# Patient Record
Sex: FEMALE | Race: WHITE | NOT HISPANIC OR LATINO | URBAN - METROPOLITAN AREA
[De-identification: names, ages, dates, MRNs, and addresses within clinical notes are randomized per-mention and may not be internally consistent; named-entity substitution may affect disease eponyms.]

---

## 2022-12-18 ENCOUNTER — INPATIENT (INPATIENT)
Facility: HOSPITAL | Age: 27
LOS: 3 days | Discharge: HOME | DRG: 189 | End: 2022-12-22
Attending: STUDENT IN AN ORGANIZED HEALTH CARE EDUCATION/TRAINING PROGRAM | Admitting: INTERNAL MEDICINE
Payer: COMMERCIAL

## 2022-12-18 VITALS
TEMPERATURE: 102 F | DIASTOLIC BLOOD PRESSURE: 92 MMHG | SYSTOLIC BLOOD PRESSURE: 148 MMHG | RESPIRATION RATE: 22 BRPM | HEART RATE: 120 BPM | OXYGEN SATURATION: 95 % | WEIGHT: 293 LBS

## 2022-12-18 LAB
ALBUMIN SERPL ELPH-MCNC: 4.1 G/DL — SIGNIFICANT CHANGE UP (ref 3.3–5.2)
ALP SERPL-CCNC: 91 U/L — SIGNIFICANT CHANGE UP (ref 40–120)
ALT FLD-CCNC: 22 U/L — SIGNIFICANT CHANGE UP
ANION GAP SERPL CALC-SCNC: 15 MMOL/L — SIGNIFICANT CHANGE UP (ref 5–17)
AST SERPL-CCNC: 19 U/L — SIGNIFICANT CHANGE UP
BASOPHILS # BLD AUTO: 0.04 K/UL — SIGNIFICANT CHANGE UP (ref 0–0.2)
BASOPHILS NFR BLD AUTO: 0.9 % — SIGNIFICANT CHANGE UP (ref 0–2)
BILIRUB SERPL-MCNC: 0.4 MG/DL — SIGNIFICANT CHANGE UP (ref 0.4–2)
BUN SERPL-MCNC: 8 MG/DL — SIGNIFICANT CHANGE UP (ref 8–20)
CALCIUM SERPL-MCNC: 9.2 MG/DL — SIGNIFICANT CHANGE UP (ref 8.4–10.5)
CHLORIDE SERPL-SCNC: 100 MMOL/L — SIGNIFICANT CHANGE UP (ref 96–108)
CO2 SERPL-SCNC: 21 MMOL/L — LOW (ref 22–29)
CREAT SERPL-MCNC: 0.93 MG/DL — SIGNIFICANT CHANGE UP (ref 0.5–1.3)
EGFR: 86 ML/MIN/1.73M2 — SIGNIFICANT CHANGE UP
EOSINOPHIL # BLD AUTO: 0.03 K/UL — SIGNIFICANT CHANGE UP (ref 0–0.5)
EOSINOPHIL NFR BLD AUTO: 0.6 % — SIGNIFICANT CHANGE UP (ref 0–6)
FLUAV AG NPH QL: DETECTED
FLUBV AG NPH QL: SIGNIFICANT CHANGE UP
GLUCOSE SERPL-MCNC: 109 MG/DL — HIGH (ref 70–99)
HCG SERPL-ACNC: <4 MIU/ML — SIGNIFICANT CHANGE UP
HCT VFR BLD CALC: 42.8 % — SIGNIFICANT CHANGE UP (ref 34.5–45)
HGB BLD-MCNC: 13.9 G/DL — SIGNIFICANT CHANGE UP (ref 11.5–15.5)
IMM GRANULOCYTES NFR BLD AUTO: 0.4 % — SIGNIFICANT CHANGE UP (ref 0–0.9)
LYMPHOCYTES # BLD AUTO: 0.58 K/UL — LOW (ref 1–3.3)
LYMPHOCYTES # BLD AUTO: 12.5 % — LOW (ref 13–44)
MCHC RBC-ENTMCNC: 26.9 PG — LOW (ref 27–34)
MCHC RBC-ENTMCNC: 32.5 GM/DL — SIGNIFICANT CHANGE UP (ref 32–36)
MCV RBC AUTO: 82.8 FL — SIGNIFICANT CHANGE UP (ref 80–100)
MONOCYTES # BLD AUTO: 0.42 K/UL — SIGNIFICANT CHANGE UP (ref 0–0.9)
MONOCYTES NFR BLD AUTO: 9.1 % — SIGNIFICANT CHANGE UP (ref 2–14)
NEUTROPHILS # BLD AUTO: 3.54 K/UL — SIGNIFICANT CHANGE UP (ref 1.8–7.4)
NEUTROPHILS NFR BLD AUTO: 76.5 % — SIGNIFICANT CHANGE UP (ref 43–77)
PLATELET # BLD AUTO: 320 K/UL — SIGNIFICANT CHANGE UP (ref 150–400)
POTASSIUM SERPL-MCNC: 3.7 MMOL/L — SIGNIFICANT CHANGE UP (ref 3.5–5.3)
POTASSIUM SERPL-SCNC: 3.7 MMOL/L — SIGNIFICANT CHANGE UP (ref 3.5–5.3)
PROT SERPL-MCNC: 7.5 G/DL — SIGNIFICANT CHANGE UP (ref 6.6–8.7)
RBC # BLD: 5.17 M/UL — SIGNIFICANT CHANGE UP (ref 3.8–5.2)
RBC # FLD: 14.4 % — SIGNIFICANT CHANGE UP (ref 10.3–14.5)
RSV RNA NPH QL NAA+NON-PROBE: SIGNIFICANT CHANGE UP
SARS-COV-2 RNA SPEC QL NAA+PROBE: SIGNIFICANT CHANGE UP
SODIUM SERPL-SCNC: 136 MMOL/L — SIGNIFICANT CHANGE UP (ref 135–145)
WBC # BLD: 4.63 K/UL — SIGNIFICANT CHANGE UP (ref 3.8–10.5)
WBC # FLD AUTO: 4.63 K/UL — SIGNIFICANT CHANGE UP (ref 3.8–10.5)

## 2022-12-18 PROCEDURE — 93010 ELECTROCARDIOGRAM REPORT: CPT

## 2022-12-18 PROCEDURE — 99285 EMERGENCY DEPT VISIT HI MDM: CPT

## 2022-12-18 PROCEDURE — 71045 X-RAY EXAM CHEST 1 VIEW: CPT | Mod: 26

## 2022-12-18 RX ORDER — ACETAMINOPHEN 500 MG
650 TABLET ORAL ONCE
Refills: 0 | Status: COMPLETED | OUTPATIENT
Start: 2022-12-18 | End: 2022-12-18

## 2022-12-18 RX ORDER — IPRATROPIUM/ALBUTEROL SULFATE 18-103MCG
3 AEROSOL WITH ADAPTER (GRAM) INHALATION
Refills: 0 | Status: COMPLETED | OUTPATIENT
Start: 2022-12-18 | End: 2022-12-18

## 2022-12-18 RX ORDER — MAGNESIUM SULFATE 500 MG/ML
2 VIAL (ML) INJECTION ONCE
Refills: 0 | Status: COMPLETED | OUTPATIENT
Start: 2022-12-18 | End: 2022-12-18

## 2022-12-18 RX ADMIN — Medication 3 MILLILITER(S): at 21:22

## 2022-12-18 RX ADMIN — Medication 3 MILLILITER(S): at 21:23

## 2022-12-18 RX ADMIN — Medication 150 GRAM(S): at 21:23

## 2022-12-18 RX ADMIN — Medication 125 MILLIGRAM(S): at 21:22

## 2022-12-18 RX ADMIN — Medication 2 GRAM(S): at 21:44

## 2022-12-18 RX ADMIN — Medication 650 MILLIGRAM(S): at 21:22

## 2022-12-18 RX ADMIN — Medication 3 MILLILITER(S): at 21:24

## 2022-12-18 NOTE — ED ADULT TRIAGE NOTE - CHIEF COMPLAINT QUOTE
C/O fever, body aches, cough, congestion and dyspnea on ex for the past 2 days. Unknown sick contacts. coming form urgent care where she tested negative for COVID and FLU.

## 2022-12-19 DIAGNOSIS — J96.01 ACUTE RESPIRATORY FAILURE WITH HYPOXIA: ICD-10-CM

## 2022-12-19 LAB
BASE EXCESS BLDA CALC-SCNC: -1.8 MMOL/L — SIGNIFICANT CHANGE UP (ref -2–3)
BLOOD GAS COMMENTS ARTERIAL: SIGNIFICANT CHANGE UP
GAS PNL BLDA: SIGNIFICANT CHANGE UP
HCO3 BLDA-SCNC: 22 MMOL/L — SIGNIFICANT CHANGE UP (ref 21–28)
HOROWITZ INDEX BLDA+IHG-RTO: 100 — SIGNIFICANT CHANGE UP
NT-PROBNP SERPL-SCNC: 279 PG/ML — SIGNIFICANT CHANGE UP (ref 0–300)
PCO2 BLDA: 31 MMHG — LOW (ref 32–45)
PH BLDA: 7.46 — HIGH (ref 7.35–7.45)
PO2 BLDA: 82 MMHG — LOW (ref 83–108)
PROCALCITONIN SERPL-MCNC: 0.07 NG/ML — SIGNIFICANT CHANGE UP (ref 0.02–0.1)
SAO2 % BLDA: 98.4 % — HIGH (ref 94–98)
TROPONIN T SERPL-MCNC: <0.01 NG/ML — SIGNIFICANT CHANGE UP (ref 0–0.06)

## 2022-12-19 PROCEDURE — 99223 1ST HOSP IP/OBS HIGH 75: CPT

## 2022-12-19 PROCEDURE — 12345: CPT | Mod: NC

## 2022-12-19 PROCEDURE — 71275 CT ANGIOGRAPHY CHEST: CPT | Mod: 26,MB

## 2022-12-19 RX ORDER — CEFTRIAXONE 500 MG/1
INJECTION, POWDER, FOR SOLUTION INTRAMUSCULAR; INTRAVENOUS
Refills: 0 | Status: DISCONTINUED | OUTPATIENT
Start: 2022-12-19 | End: 2022-12-19

## 2022-12-19 RX ORDER — ENOXAPARIN SODIUM 100 MG/ML
40 INJECTION SUBCUTANEOUS EVERY 24 HOURS
Refills: 0 | Status: DISCONTINUED | OUTPATIENT
Start: 2022-12-19 | End: 2022-12-22

## 2022-12-19 RX ORDER — IPRATROPIUM/ALBUTEROL SULFATE 18-103MCG
3 AEROSOL WITH ADAPTER (GRAM) INHALATION ONCE
Refills: 0 | Status: COMPLETED | OUTPATIENT
Start: 2022-12-19 | End: 2022-12-19

## 2022-12-19 RX ORDER — ACETAMINOPHEN 500 MG
650 TABLET ORAL EVERY 6 HOURS
Refills: 0 | Status: DISCONTINUED | OUTPATIENT
Start: 2022-12-19 | End: 2022-12-22

## 2022-12-19 RX ORDER — ONDANSETRON 8 MG/1
4 TABLET, FILM COATED ORAL EVERY 8 HOURS
Refills: 0 | Status: DISCONTINUED | OUTPATIENT
Start: 2022-12-19 | End: 2022-12-22

## 2022-12-19 RX ORDER — CEFTRIAXONE 500 MG/1
1000 INJECTION, POWDER, FOR SOLUTION INTRAMUSCULAR; INTRAVENOUS EVERY 24 HOURS
Refills: 0 | Status: DISCONTINUED | OUTPATIENT
Start: 2022-12-20 | End: 2022-12-21

## 2022-12-19 RX ORDER — AZITHROMYCIN 500 MG/1
500 TABLET, FILM COATED ORAL DAILY
Refills: 0 | Status: DISCONTINUED | OUTPATIENT
Start: 2022-12-19 | End: 2022-12-21

## 2022-12-19 RX ORDER — LANOLIN ALCOHOL/MO/W.PET/CERES
3 CREAM (GRAM) TOPICAL AT BEDTIME
Refills: 0 | Status: DISCONTINUED | OUTPATIENT
Start: 2022-12-19 | End: 2022-12-22

## 2022-12-19 RX ORDER — CEFTRIAXONE 500 MG/1
1000 INJECTION, POWDER, FOR SOLUTION INTRAMUSCULAR; INTRAVENOUS ONCE
Refills: 0 | Status: COMPLETED | OUTPATIENT
Start: 2022-12-19 | End: 2022-12-19

## 2022-12-19 RX ORDER — CEFTRIAXONE 500 MG/1
INJECTION, POWDER, FOR SOLUTION INTRAMUSCULAR; INTRAVENOUS
Refills: 0 | Status: DISCONTINUED | OUTPATIENT
Start: 2022-12-19 | End: 2022-12-21

## 2022-12-19 RX ORDER — IPRATROPIUM/ALBUTEROL SULFATE 18-103MCG
3 AEROSOL WITH ADAPTER (GRAM) INHALATION EVERY 6 HOURS
Refills: 0 | Status: DISCONTINUED | OUTPATIENT
Start: 2022-12-19 | End: 2022-12-21

## 2022-12-19 RX ADMIN — Medication 75 MILLIGRAM(S): at 18:01

## 2022-12-19 RX ADMIN — Medication 3 MILLILITER(S): at 10:27

## 2022-12-19 RX ADMIN — Medication 3 MILLILITER(S): at 15:17

## 2022-12-19 RX ADMIN — AZITHROMYCIN 500 MILLIGRAM(S): 500 TABLET, FILM COATED ORAL at 10:38

## 2022-12-19 RX ADMIN — Medication 3 MILLILITER(S): at 23:13

## 2022-12-19 RX ADMIN — Medication 650 MILLIGRAM(S): at 10:39

## 2022-12-19 RX ADMIN — ENOXAPARIN SODIUM 40 MILLIGRAM(S): 100 INJECTION SUBCUTANEOUS at 10:38

## 2022-12-19 RX ADMIN — Medication 3 MILLILITER(S): at 00:58

## 2022-12-19 RX ADMIN — CEFTRIAXONE 1000 MILLIGRAM(S): 500 INJECTION, POWDER, FOR SOLUTION INTRAMUSCULAR; INTRAVENOUS at 10:37

## 2022-12-19 RX ADMIN — Medication 40 MILLIGRAM(S): at 22:35

## 2022-12-19 RX ADMIN — Medication 75 MILLIGRAM(S): at 03:08

## 2022-12-19 RX ADMIN — Medication 40 MILLIGRAM(S): at 13:57

## 2022-12-19 RX ADMIN — Medication 40 MILLIGRAM(S): at 06:33

## 2022-12-19 NOTE — ED PROVIDER NOTE - OBJECTIVE STATEMENT
pt is a 28 y/o female with no pmhx presenting to the ed for evaluation. pt with fever cough, states difficulty breathing onset tonight. pt states no recent sick contacts. pt vapes. pt denies cardiac hx no hx of dvt or pe no calf pain or leg swelling no recent surgeries or travel  pt with no headache neck pain abd pain vomiting diarrhea back pain dysuria numbness or loss of sensation

## 2022-12-19 NOTE — H&P ADULT - NSHPPHYSICALEXAM_GEN_ALL_CORE
Vital Signs Last 24 Hrs  T(C): 37.6 (19 Dec 2022 00:03), Max: 38.7 (18 Dec 2022 19:41)  T(F): 99.6 (19 Dec 2022 00:03), Max: 101.7 (18 Dec 2022 19:41)  HR: 107 (19 Dec 2022 00:03) (107 - 120)  BP: 153/84 (19 Dec 2022 00:03) (148/92 - 153/84)  BP(mean): --  RR: 22 (19 Dec 2022 00:03) (22 - 22)  SpO2: 93% (19 Dec 2022 00:03) (93% - 95%)    Parameters below as of 19 Dec 2022 00:03  Patient On (Oxygen Delivery Method): HFNC 40LPM 60% FIO2     Constitutional: NAD, VSS  Head: NC/AT  Eyes: PERRL, EOMI, anicteric sclera, conjunctiva WNL  ENT: Normal Pharynx, MMM, No tonsillar exudate/erythema  Neck: Supple, Non-tender  Chest: Non-tender, no rashes  Cardio: RRR, s1/s2, no appreciable murmurs/rubs/gallops  Resp: +Unable to appreciate adventitious BS 2/2 body habitus  Abd: Soft, Non-tender, Non-distended, no rebound/guarding/rigidity  : not examined  Rectal: not examined  MSK: moving all extremities, no motor weakness, full ROM x4  Ext: palpable distal pulses, good capillary refill, no clubbing/cyanosis/edema  Psych: appropriate, cooperative  Neuro: CN II-XII grossly intact, no focal deficits  Skin: Warm/Dry. No rashes.

## 2022-12-19 NOTE — CONSULT NOTE ADULT - ASSESSMENT
26 y/o F with a h/o morbid obesity, daily e-cigarette use, with:    # Acute hypoxemic respiratory failure  # Influenza A pneumonia    Patient does not require MICU level of care at this time. Please reconsult as appropriate if condition deteriorates.    Suspect viral pneumonia with possible superimposed bacterial component given length of time since symptom onset. May have underlying asthma given reports of recurrent bronchospasm. CTA chest pending to better evaluate lung parenchyma and also rule out pulmonary embolism.    - maintain HFNC, FiO2 weaned from 100% --> 60% during evaluation, continue at 40 LPM  - start oseltamivir, recommend empiric CAP coverage for now as well  - obtain sputum culture if able  - CTA chest pending  - recommend ATC inhaled bronchodilators  - not bronchospastic at the moment, would hold off on further steroids for now in the setting of influenza until there is stronger evidence of underlying reactive airway disease  - counseled on e-cig cessation    Case discussed with MICU physician, Dr. Andre.    26 y/o F with a h/o morbid obesity, daily e-cigarette use, with:    # Acute hypoxemic respiratory failure  # Influenza A pneumonia    Patient does not require MICU level of care at this time. Please reconsult as appropriate if condition deteriorates.    Suspect viral pneumonia with possible superimposed bacterial component given length of time since symptom onset. May have underlying asthma given reports of recurrent bronchospasm. CTA chest pending to better evaluate lung parenchyma and also rule out pulmonary embolism.    - maintain HFNC, FiO2 weaned from 100% --> 60% during evaluation, continue at 40 LPM  - start oseltamivir, recommend empiric CAP coverage for now as well  - obtain sputum culture if able, check procalcitonin level  - CTA chest pending  - recommend ATC inhaled bronchodilators  - not bronchospastic at the moment, would hold off on further steroids for now in the setting of influenza until there is stronger evidence of underlying reactive airway disease  - counseled on e-cig cessation    Case discussed with MICU physician, Dr. Andre.

## 2022-12-19 NOTE — H&P ADULT - REASON FOR ADMISSION
Acute Hypoxic Resp Failrue 2/2 FLU PNA The patient is a 47y Female complaining of altered mental status.

## 2022-12-19 NOTE — H&P ADULT - ASSESSMENT
ASSESSMENT:  27M with PMHX Obesity, E-Cigarette Abuse presents to [facility] ER c/o SOB/ACOSTA, cough, fever/chills for past several days admitted for Acute Hypoxemic Respiratory Failure 2/2 Influenza A PNA r/o Atypical PNA.    PLAN:  Acute Hypoxemic Respiratory Failure 2/2 Influenza A PNA r/o Atypical PNA  -Admit to SDU  -HFNC 40LPM 60% FIO2 titrate for Goal SPO2 >92%  -CXR no infiltrates  -CTA Chest negative for PE +Atelectasis +Mosaic/GGO  -FLU A positive  -Difficult to appreciate wheezing on lung exam due to body habitus  -Solumedrol 125mg IV x1  -Cont Solumedrol 40mg IV q8 given significant degree of hypoxia  -Duonebs q6 ATC  -Magnesium Sulfate 2g IV x1  -Tamiflu 75mg BID  -Check Sputum/MRSA/Legionella/Strep r/o Atypical PNA  -Check Procal  -Hold ABX for now pending source ID given probable influenza PNA  -Check ABG in AM to assess hypoxia and metabolic acidosis on BMP  -VTE PPX LMWH  -MICU Consulted    Nicotine Abuse  - cessation

## 2022-12-19 NOTE — ED ADULT NURSE REASSESSMENT NOTE - NS ED NURSE REASSESS COMMENT FT1
pt de-sated to 88-89% on 5LNC, pt placed on nonrebreather, Adilson MUÑOZ called to bedside, pt now sating at 95% non-reabreather mask, pt to be placed on Hi-flow
Assumed care of pt at 2300 as stated in report from LESIA Hanna. Charting as noted. Patient A&O x4, denies pain/discomfort, denies CP/SOB. Updated on the plan of care. Call bell within reach, bed locked in lowest position. IV site flushed w/ NS. No redness, swelling or pain noted to site. No signs of acute distress noted, safety maintained.

## 2022-12-19 NOTE — ED PROVIDER NOTE - PROGRESS NOTE DETAILS
pt on 6l nasal cannual no improvement down to 85 o2% - resp therapist called high flow started icu consult placed will admit to medicine strepdown

## 2022-12-19 NOTE — CONSULT NOTE ADULT - SUBJECTIVE AND OBJECTIVE BOX
Patient is a 27y old  Female who presents with a chief complaint of     BRIEF HOSPITAL COURSE: 26 y/o F with a h/o morbid obesity,         PAST MEDICAL & SURGICAL HISTORY:    Allergies    amoxicillin (Hives)  penicillin (Hives)    Intolerances      FAMILY HISTORY:      Review of Systems:  CONSTITUTIONAL: No fever, chills, or fatigue  EYES: No eye pain, visual disturbances, or discharge  ENMT:  No difficulty hearing, tinnitus, vertigo; No sinus or throat pain  NECK: No pain or stiffness  RESPIRATORY: No cough, wheezing, chills or hemoptysis; No shortness of breath  CARDIOVASCULAR: No chest pain, palpitations, dizziness, or leg swelling  GASTROINTESTINAL: No abdominal or epigastric pain. No nausea, vomiting, or hematemesis; No diarrhea or constipation. No melena or hematochezia.  GENITOURINARY: No dysuria, frequency, hematuria, or incontinence  NEUROLOGICAL: No headaches, memory loss, loss of strength, numbness, or tremors  SKIN: No itching, burning, rashes, or lesions   MUSCULOSKELETAL: No joint pain or swelling; No muscle, back, or extremity pain  PSYCHIATRIC: No depression, anxiety, mood swings, or difficulty sleeping      Social History: ***      Medications:                                  ICU Vital Signs Last 24 Hrs  T(C): 37.6 (19 Dec 2022 00:03), Max: 38.7 (18 Dec 2022 19:41)  T(F): 99.6 (19 Dec 2022 00:03), Max: 101.7 (18 Dec 2022 19:41)  HR: 107 (19 Dec 2022 00:03) (107 - 120)  BP: 153/84 (19 Dec 2022 00:03) (148/92 - 153/84)  BP(mean): --  ABP: --  ABP(mean): --  RR: 22 (19 Dec 2022 00:03) (22 - 22)  SpO2: 93% (19 Dec 2022 00:03) (93% - 95%)    O2 Parameters below as of 19 Dec 2022 00:03  Patient On (Oxygen Delivery Method): nasal cannula  O2 Flow (L/min): 5        Vital Signs Last 24 Hrs  T(C): 37.6 (19 Dec 2022 00:03), Max: 38.7 (18 Dec 2022 19:41)  T(F): 99.6 (19 Dec 2022 00:03), Max: 101.7 (18 Dec 2022 19:41)  HR: 107 (19 Dec 2022 00:03) (107 - 120)  BP: 153/84 (19 Dec 2022 00:03) (148/92 - 153/84)  BP(mean): --  RR: 22 (19 Dec 2022 00:03) (22 - 22)  SpO2: 93% (19 Dec 2022 00:03) (93% - 95%)    Parameters below as of 19 Dec 2022 00:03  Patient On (Oxygen Delivery Method): nasal cannula  O2 Flow (L/min): 5          I&O's Detail        LABS:                        13.9   4.63  )-----------( 320      ( 18 Dec 2022 21:30 )             42.8     12-18    136  |  100  |  8.0  ----------------------------<  109<H>  3.7   |  21.0<L>  |  0.93    Ca    9.2      18 Dec 2022 21:30    TPro  7.5  /  Alb  4.1  /  TBili  0.4  /  DBili  x   /  AST  19  /  ALT  22  /  AlkPhos  91  12-18      CARDIAC MARKERS ( 19 Dec 2022 01:54 )  x     / <0.01 ng/mL / x     / x     / x          CAPILLARY BLOOD GLUCOSE            CULTURES:        Physical Examination:    General: No acute distress.  Alert, oriented, interactive, nonfocal    HEENT: Pupils equal, reactive to light.  Symmetric.    PULM: Clear to auscultation bilaterally, no significant sputum production    CVS: Regular rate and rhythm, no murmurs, rubs, or gallops    ABD: Soft, nondistended, nontender, normoactive bowel sounds, no masses    EXT: No edema, nontender    SKIN: Warm and well perfused, no rashes noted.    NEURO: A&Ox3, strength 5/5 all extremities, cranial nerves grossly intact, no focal deficits      RADIOLOGY: ***        CRITICAL CARE TIME SPENT: ***  Time spent evaluating/treating patient with medical issues that pose a high risk for life threatening deterioration and/or end-organ damage, reviewing data/labs/imaging, discussing case with multidisciplinary team, discussing plan/goals of care with patient/family. Non-inclusive of procedure time.   Patient is a 27y old  Female who presents with a chief complaint of     BRIEF HOSPITAL COURSE: 28 y/o F with a h/o morbid obesity, daily e-cigarette use, presents to the ED with complaints of progressive dyspnea on exertion, productive cough, fevers, and myalgias over the past several days, however she mentions that she has been feeling unwell for the past few weeks. She mentioned using an "over the counter" inhaler as she noticed herself to be wheezing. 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 Influenza A positive. No work of breathing while she is at rest, however she was found to be significantly hypoxemic and was started on HFNC. CXR does not appear to show any major airspace infiltrates. CTA chest pending.        PAST MEDICAL & SURGICAL HISTORY:    Allergies    amoxicillin (Hives)  penicillin (Hives)    Intolerances      FAMILY HISTORY:      Review of Systems:  CONSTITUTIONAL: (+) fever, chills, fatigue  EYES: No eye pain, visual disturbances, or discharge  ENMT:  No difficulty hearing, tinnitus, vertigo; No sinus or throat pain  NECK: No pain or stiffness  RESPIRATORY: (+) cough, wheezing, chills, no hemoptysis; (+) shortness of breath  CARDIOVASCULAR: No chest pain, palpitations, dizziness, or leg swelling  GASTROINTESTINAL: No abdominal or epigastric pain. No nausea, vomiting, or hematemesis; No diarrhea or constipation. No melena or hematochezia.  GENITOURINARY: No dysuria, frequency, hematuria, or incontinence  NEUROLOGICAL: No headaches, memory loss, loss of strength, numbness, or tremors  SKIN: No itching, burning, rashes, or lesions   MUSCULOSKELETAL: No joint pain or swelling; No muscle, back, or extremity pain  PSYCHIATRIC: No depression, anxiety, mood swings, or difficulty sleeping      Medications:      ICU Vital Signs Last 24 Hrs  T(C): 37.6 (19 Dec 2022 00:03), Max: 38.7 (18 Dec 2022 19:41)  T(F): 99.6 (19 Dec 2022 00:03), Max: 101.7 (18 Dec 2022 19:41)  HR: 107 (19 Dec 2022 00:03) (107 - 120)  BP: 153/84 (19 Dec 2022 00:03) (148/92 - 153/84)  BP(mean): --  ABP: --  ABP(mean): --  RR: 22 (19 Dec 2022 00:03) (22 - 22)  SpO2: 93% (19 Dec 2022 00:03) (93% - 95%)    O2 Parameters below as of 19 Dec 2022 00:03  Patient On (Oxygen Delivery Method): nasal cannula  O2 Flow (L/min): 5        Vital Signs Last 24 Hrs  T(C): 37.6 (19 Dec 2022 00:03), Max: 38.7 (18 Dec 2022 19:41)  T(F): 99.6 (19 Dec 2022 00:03), Max: 101.7 (18 Dec 2022 19:41)  HR: 107 (19 Dec 2022 00:03) (107 - 120)  BP: 153/84 (19 Dec 2022 00:03) (148/92 - 153/84)  BP(mean): --  RR: 22 (19 Dec 2022 00:03) (22 - 22)  SpO2: 93% (19 Dec 2022 00:03) (93% - 95%)    Parameters below as of 19 Dec 2022 00:03  Patient On (Oxygen Delivery Method): nasal cannula  O2 Flow (L/min): 5          I&O's Detail        LABS:                        13.9   4.63  )-----------( 320      ( 18 Dec 2022 21:30 )             42.8     12-18    136  |  100  |  8.0  ----------------------------<  109<H>  3.7   |  21.0<L>  |  0.93    Ca    9.2      18 Dec 2022 21:30    TPro  7.5  /  Alb  4.1  /  TBili  0.4  /  DBili  x   /  AST  19  /  ALT  22  /  AlkPhos  91  12-18      CARDIAC MARKERS ( 19 Dec 2022 01:54 )  x     / <0.01 ng/mL / x     / x     / x          CAPILLARY BLOOD GLUCOSE            CULTURES:        Physical Examination:    General: No acute distress.  Alert, oriented, interactive, nonfocal, morbidly obese    HEENT: Pupils equal, reactive to light.  Symmetric.    PULM: Clear to auscultation bilaterally, no significant sputum production    CVS: tachycardic, reg rhythm, no murmurs, rubs, or gallops    ABD: Soft, nondistended, nontender, normoactive bowel sounds, no masses    EXT: No edema, nontender    SKIN: Warm and well perfused, no rashes noted.    NEURO: A&Ox3, strength 5/5 all extremities, cranial nerves grossly intact, no focal deficits      RADIOLOGY:  n/a

## 2022-12-19 NOTE — PATIENT PROFILE ADULT - FALL HARM RISK - UNIVERSAL INTERVENTIONS
Bed in lowest position, wheels locked, appropriate side rails in place/Call bell, personal items and telephone in reach/Instruct patient to call for assistance before getting out of bed or chair/Non-slip footwear when patient is out of bed/[place] to call system/Physically safe environment - no spills, clutter or unnecessary equipment/Purposeful Proactive Rounding/Room/bathroom lighting operational, light cord in reach

## 2022-12-19 NOTE — PATIENT PROFILE ADULT - ARRIVAL FROM
Home: Lisinopril 40 mg QD, Coreg 12.5 mg BID    PLAN:  -- Hold Lisinopril while BP is soft and patient has contrast induced ROSLYN  -- Goal BP <140/90     Home

## 2022-12-19 NOTE — ED PROVIDER NOTE - ATTENDING APP SHARED VISIT CONTRIBUTION OF CARE
pt with fever cough and sob; h/o  smoking e cigarettes;  pe  lungs + retractions, + wheezing;    dx sob; tx with nebs, oxygen and reassess

## 2022-12-19 NOTE — CONSULT NOTE ADULT - NS PANP COMMENT GEN_ALL_CORE FT
PCCM Attending Attestation:    Patient was seen and examined at the bedside. I was physically present for the key portions of the evaluation and management (E/M) service provided. Agree with history, physical exam, assessment, and plan as outlined by YANCY note above, with the following additions and/or comments:    28 y/o F present on 12/18 for dyspnea on exertion for past 2 days.  Symptoms associated with fever, body aches, cough, congestion.  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   In the ED, initial VS: 148/92, , RR 22, temp 101.7F, 95% on RA, weight 163 Kg (360 pound).  Subsequently patient desat to 88% on 5L, then pt placed on NRB, and eventually switch to high flow.  MICU was called after FIO2 requirement escalated to 100%   Lab signifcaint for Flu A+.  CXR bibasilar atelectasis, no consolidation.  CTA shows no PE, but nonspecific mosaic attentuation/ground glass.    #acute hypoxic respiratory failure 2/2 to Flu A, r/o atypical infection  #morbid obesity    - start tamiflu  - nebs PRN   - wean FIO2 to SpO2 > 92%   - incentive spirometer  - ceftria and azithromycin for empiric coverage of CAP and atypical coverage   -will send MRSA nares, mycoplasma antibodies, urinary strept antigen   -send procal   -Hold off steroid for now given not bronchospastic  -recommend pulmonary consult   -can admit to step down      Silviano Andre M.D.  Attending Physician  [facility]   Pulmonary & Critical Care Medicine PCCM Attending Attestation:    Patient was seen and examined at the bedside. I was physically present for the key portions of the evaluation and management (E/M) service provided. Agree with history, physical exam, assessment, and plan as outlined by YANCY note above, with the following additions and/or comments:    28 y/o F present on 12/18 for dyspnea on exertion for past 2 days.  Symptoms associated with fever, body aches, cough, congestion.  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   In the ED, initial VS: 148/92, , RR 22, temp 101.7F, 95% on RA, weight 163 Kg (360 pound).  Subsequently patient desat to 88% on 5L, then pt placed on NRB, and eventually switch to high flow.  MICU was called after FIO2 requirement escalated to 100%   Lab signifcaint for Flu A+.  CXR bibasilar atelectasis, no consolidation.  CTA shows no PE, but nonspecific mosaic attentuation/ground glass.    #acute hypoxic respiratory failure 2/2 to Flu A, r/o atypical infection  #morbid obesity    - start tamiflu  - nebs PRN   - wean FIO2 to SpO2 > 92%   - incentive spirometer  - ceftria and azithromycin for empiric coverage of CAP and atypical   -will send MRSA nares, mycoplasma antibodies, urinary strept antigen   -send procal   -Hold off steroid for now given not bronchospastic  -recommend pulmonary consult   -can admit to step down      Silviano Andre M.D.  Attending Physician  [facility]   Pulmonary & Critical Care Medicine

## 2022-12-19 NOTE — H&P ADULT - HISTORY OF PRESENT ILLNESS
27M with PMHX Obesity, E-Cigarette Abuse presents to [facility] ER c/o SOB/ACOSTA, cough, fever/chills for past several days which has progressively worsened. Using OTC Inhaler without improvement. Reported wheezing. +Sick Contacts works at . Found to be FLU A positive. Hypoxemic on arrival de-satting to 88% on 5L NC. Patient placed on HFNC 40LPM FIO2 60% satting 92-93%. MICU consulted. Patient seen/examined. Febrile in ED Tmax 101.7F. CXR negative. CTA +BL Atelectasis +GGO no PE.     ROS negative unless mentioned.    PMHX: Obesity  PSHX: Denies  FamHx: denies fam hx HTN  Social Hx: Social ETOH use. Daily E-Cigarette use.   Allergies: PCN, Amoxicillin  Not vaccinated for FLU.

## 2022-12-19 NOTE — ED PROVIDER NOTE - NS ED ATTENDING STATEMENT MOD
This was a shared visit with the YANCY. I reviewed and verified the documentation and independently performed the documented:

## 2022-12-19 NOTE — PROGRESS NOTE ADULT - ASSESSMENT
27M with PMHX Obesity, E-Cigarette Abuse presents to [facility] ER c/o SOB/ACOTSA, cough, fever/chills for past several days admitted for Acute Hypoxemic Respiratory Failure 2/2 Influenza A PNA r/o Atypical PNA.    PLAN:  Acute Hypoxemic Respiratory Failure 2/2 Influenza A PNA r/o atypical PNA  -Admit to SDU  -HFNC 40LPM 60% FIO2 titrate for Goal SPO2 >92%  -CXR no infiltrates  -CTA Chest negative for PE +Atelectasis +Mosaic/GGO  -FLU A positive  -Cont Solumedrol 40mg IV q8 given significant degree of hypoxia  -Duonebs q6 ATC  -Magnesium Sulfate 2g IV x1  -Tamiflu 75mg BID  -Check Sputum/MRSA/Legionella/Strep r/o Atypical PNA  -Check Procal  -Hold ABX for now pending source ID given probable influenza PNA  -Check ABG in AM to assess hypoxia and metabolic acidosis on BMP  -VTE PPX LMWH  -MICU Consulted    Nicotine Abuse  - cessation 27M with PMHX Obesity, E-Cigarette Abuse presents to [facility] ER c/o SOB/ACOSTA, cough, fever/chills for past several days admitted for Acute Hypoxemic Respiratory Failure 2/2 Influenza A PNA r/o Atypical PNA.    PLAN:  Acute Hypoxemic Respiratory Failure 2/2 Influenza A PNA r/o atypical PNA  -Admit to SDU  -HFNC 40LPM 60% FIO2 titrate for Goal SPO2 >92%  -CTA Chest negative for PE +Atelectasis +Mosaic/GGO  -FLU A positive  -Duonebs q6 ATC and Tamiflu 75mg BID  -Check Sputum/MRSA/Legionella/Strep r/o Atypical PNA  -MICU Consulted  - C/w Solumedrol 40mg IV q8 given significant degree of hypoxia  - Started AZT/CTX for CAP coverage  - Pulm c/s placed    Nicotine Abuse  - cessation    DVT ppx- Lovenox  Diet- Regular diet

## 2022-12-19 NOTE — ED PROVIDER NOTE - PHYSICAL EXAMINATION
Const: Awake, alert and oriented. in distress   HEENT: NC/AT. Moist mucous membranes.  Eyes: No scleral icterus. EOMI.  Neck:. Soft and supple. Full ROM without pain.  Cardiac: Regular rate and regular rhythm. +S1/S2. No murmurs. Peripheral pulses 2+ and symmetric. No LE edema.  Resp: Speaking in full sentences. retractions noted wheezing on auscultation   Abd: Soft, non-tender, non-distended. Normal bowel sounds in all 4 quadrants. No guarding or rebound.  Back: Spine midline and non-tender. No CVAT.  Skin: No rashes, abrasions or lacerations.  Lymph: No cervical lymphadenopathy.  Neuro: Awake, alert & oriented x 3. Moves all extremities symmetrically.

## 2022-12-20 LAB
ALBUMIN SERPL ELPH-MCNC: 3.9 G/DL — SIGNIFICANT CHANGE UP (ref 3.3–5.2)
ALP SERPL-CCNC: 89 U/L — SIGNIFICANT CHANGE UP (ref 40–120)
ALT FLD-CCNC: 21 U/L — SIGNIFICANT CHANGE UP
ANION GAP SERPL CALC-SCNC: 15 MMOL/L — SIGNIFICANT CHANGE UP (ref 5–17)
AST SERPL-CCNC: 19 U/L — SIGNIFICANT CHANGE UP
BASOPHILS # BLD AUTO: 0.01 K/UL — SIGNIFICANT CHANGE UP (ref 0–0.2)
BASOPHILS NFR BLD AUTO: 0.1 % — SIGNIFICANT CHANGE UP (ref 0–2)
BILIRUB SERPL-MCNC: 0.2 MG/DL — LOW (ref 0.4–2)
BUN SERPL-MCNC: 11.5 MG/DL — SIGNIFICANT CHANGE UP (ref 8–20)
CALCIUM SERPL-MCNC: 9.2 MG/DL — SIGNIFICANT CHANGE UP (ref 8.4–10.5)
CHLORIDE SERPL-SCNC: 103 MMOL/L — SIGNIFICANT CHANGE UP (ref 96–108)
CO2 SERPL-SCNC: 21 MMOL/L — LOW (ref 22–29)
CREAT SERPL-MCNC: 0.7 MG/DL — SIGNIFICANT CHANGE UP (ref 0.5–1.3)
EGFR: 121 ML/MIN/1.73M2 — SIGNIFICANT CHANGE UP
EOSINOPHIL # BLD AUTO: 0 K/UL — SIGNIFICANT CHANGE UP (ref 0–0.5)
EOSINOPHIL NFR BLD AUTO: 0 % — SIGNIFICANT CHANGE UP (ref 0–6)
GLUCOSE SERPL-MCNC: 173 MG/DL — HIGH (ref 70–99)
HCT VFR BLD CALC: 45.8 % — HIGH (ref 34.5–45)
HGB BLD-MCNC: 14.4 G/DL — SIGNIFICANT CHANGE UP (ref 11.5–15.5)
IMM GRANULOCYTES NFR BLD AUTO: 0.5 % — SIGNIFICANT CHANGE UP (ref 0–0.9)
LYMPHOCYTES # BLD AUTO: 0.87 K/UL — LOW (ref 1–3.3)
LYMPHOCYTES # BLD AUTO: 7.5 % — LOW (ref 13–44)
MAGNESIUM SERPL-MCNC: 2 MG/DL — SIGNIFICANT CHANGE UP (ref 1.6–2.6)
MCHC RBC-ENTMCNC: 26.6 PG — LOW (ref 27–34)
MCHC RBC-ENTMCNC: 31.4 GM/DL — LOW (ref 32–36)
MCV RBC AUTO: 84.7 FL — SIGNIFICANT CHANGE UP (ref 80–100)
MONOCYTES # BLD AUTO: 0.3 K/UL — SIGNIFICANT CHANGE UP (ref 0–0.9)
MONOCYTES NFR BLD AUTO: 2.6 % — SIGNIFICANT CHANGE UP (ref 2–14)
NEUTROPHILS # BLD AUTO: 10.43 K/UL — HIGH (ref 1.8–7.4)
NEUTROPHILS NFR BLD AUTO: 89.3 % — HIGH (ref 43–77)
PHOSPHATE SERPL-MCNC: 3.3 MG/DL — SIGNIFICANT CHANGE UP (ref 2.4–4.7)
PLATELET # BLD AUTO: 332 K/UL — SIGNIFICANT CHANGE UP (ref 150–400)
POTASSIUM SERPL-MCNC: 4 MMOL/L — SIGNIFICANT CHANGE UP (ref 3.5–5.3)
POTASSIUM SERPL-SCNC: 4 MMOL/L — SIGNIFICANT CHANGE UP (ref 3.5–5.3)
PROT SERPL-MCNC: 6.8 G/DL — SIGNIFICANT CHANGE UP (ref 6.6–8.7)
RBC # BLD: 5.41 M/UL — HIGH (ref 3.8–5.2)
RBC # FLD: 14.7 % — HIGH (ref 10.3–14.5)
SODIUM SERPL-SCNC: 139 MMOL/L — SIGNIFICANT CHANGE UP (ref 135–145)
WBC # BLD: 11.67 K/UL — HIGH (ref 3.8–10.5)
WBC # FLD AUTO: 11.67 K/UL — HIGH (ref 3.8–10.5)

## 2022-12-20 PROCEDURE — 99233 SBSQ HOSP IP/OBS HIGH 50: CPT

## 2022-12-20 RX ADMIN — Medication 650 MILLIGRAM(S): at 10:00

## 2022-12-20 RX ADMIN — Medication 650 MILLIGRAM(S): at 01:06

## 2022-12-20 RX ADMIN — Medication 40 MILLIGRAM(S): at 17:23

## 2022-12-20 RX ADMIN — Medication 650 MILLIGRAM(S): at 17:20

## 2022-12-20 RX ADMIN — Medication 650 MILLIGRAM(S): at 02:00

## 2022-12-20 RX ADMIN — Medication 3 MILLILITER(S): at 20:14

## 2022-12-20 RX ADMIN — Medication 650 MILLIGRAM(S): at 17:50

## 2022-12-20 RX ADMIN — Medication 650 MILLIGRAM(S): at 09:30

## 2022-12-20 RX ADMIN — Medication 75 MILLIGRAM(S): at 17:24

## 2022-12-20 RX ADMIN — Medication 40 MILLIGRAM(S): at 05:35

## 2022-12-20 RX ADMIN — Medication 75 MILLIGRAM(S): at 05:35

## 2022-12-20 RX ADMIN — ENOXAPARIN SODIUM 40 MILLIGRAM(S): 100 INJECTION SUBCUTANEOUS at 12:00

## 2022-12-20 RX ADMIN — CEFTRIAXONE 1000 MILLIGRAM(S): 500 INJECTION, POWDER, FOR SOLUTION INTRAMUSCULAR; INTRAVENOUS at 09:30

## 2022-12-20 RX ADMIN — AZITHROMYCIN 500 MILLIGRAM(S): 500 TABLET, FILM COATED ORAL at 12:04

## 2022-12-20 RX ADMIN — Medication 3 MILLILITER(S): at 04:35

## 2022-12-20 RX ADMIN — Medication 3 MILLILITER(S): at 08:23

## 2022-12-20 RX ADMIN — Medication 3 MILLILITER(S): at 15:36

## 2022-12-20 NOTE — PROGRESS NOTE ADULT - ASSESSMENT
27M with PMHX Obesity, E-Cigarette Abuse presents to [facility] ER c/o SOB/ACOSTA, cough, fever/chills for past several days admitted for Acute Hypoxemic Respiratory Failure 2/2 Influenza A PNA r/o Atypical PNA.    PLAN:  Acute Hypoxemic Respiratory Failure 2/2 Influenza A PNA r/o atypical PNA  -Admit to SDU  -CTA Chest negative for PE +Atelectasis +Mosaic/GGO  -FLU A positive  -Duonebs q6 ATC and Tamiflu 75mg BID  -MICU Consulted  - Transitioned to solumedrol 40mg IV q12 given significant degree of hypoxia  - C/w AZT/CTX D2/3  - Pulm recs appreciated    Nicotine Abuse  - cessation    DVT ppx- Lovenox  Diet- Regular diet  Discharge likely tomorrow.

## 2022-12-21 LAB
ALBUMIN SERPL ELPH-MCNC: 3.4 G/DL — SIGNIFICANT CHANGE UP (ref 3.3–5.2)
ALP SERPL-CCNC: 75 U/L — SIGNIFICANT CHANGE UP (ref 40–120)
ALT FLD-CCNC: 17 U/L — SIGNIFICANT CHANGE UP
ANION GAP SERPL CALC-SCNC: 12 MMOL/L — SIGNIFICANT CHANGE UP (ref 5–17)
AST SERPL-CCNC: 14 U/L — SIGNIFICANT CHANGE UP
BILIRUB SERPL-MCNC: <0.2 MG/DL — LOW (ref 0.4–2)
BUN SERPL-MCNC: 12 MG/DL — SIGNIFICANT CHANGE UP (ref 8–20)
CALCIUM SERPL-MCNC: 8.9 MG/DL — SIGNIFICANT CHANGE UP (ref 8.4–10.5)
CHLORIDE SERPL-SCNC: 103 MMOL/L — SIGNIFICANT CHANGE UP (ref 96–108)
CO2 SERPL-SCNC: 22 MMOL/L — SIGNIFICANT CHANGE UP (ref 22–29)
CREAT SERPL-MCNC: 0.69 MG/DL — SIGNIFICANT CHANGE UP (ref 0.5–1.3)
EGFR: 121 ML/MIN/1.73M2 — SIGNIFICANT CHANGE UP
GLUCOSE SERPL-MCNC: 143 MG/DL — HIGH (ref 70–99)
GRAM STN FLD: SIGNIFICANT CHANGE UP
HCT VFR BLD CALC: 42.4 % — SIGNIFICANT CHANGE UP (ref 34.5–45)
HGB BLD-MCNC: 13.3 G/DL — SIGNIFICANT CHANGE UP (ref 11.5–15.5)
LEGIONELLA AG UR QL: NEGATIVE — SIGNIFICANT CHANGE UP
MCHC RBC-ENTMCNC: 26.9 PG — LOW (ref 27–34)
MCHC RBC-ENTMCNC: 31.4 GM/DL — LOW (ref 32–36)
MCV RBC AUTO: 85.8 FL — SIGNIFICANT CHANGE UP (ref 80–100)
MRSA PCR RESULT.: SIGNIFICANT CHANGE UP
PLATELET # BLD AUTO: 335 K/UL — SIGNIFICANT CHANGE UP (ref 150–400)
POTASSIUM SERPL-MCNC: 4 MMOL/L — SIGNIFICANT CHANGE UP (ref 3.5–5.3)
POTASSIUM SERPL-SCNC: 4 MMOL/L — SIGNIFICANT CHANGE UP (ref 3.5–5.3)
PROT SERPL-MCNC: 6.5 G/DL — LOW (ref 6.6–8.7)
RBC # BLD: 4.94 M/UL — SIGNIFICANT CHANGE UP (ref 3.8–5.2)
RBC # FLD: 14.9 % — HIGH (ref 10.3–14.5)
S AUREUS DNA NOSE QL NAA+PROBE: SIGNIFICANT CHANGE UP
SODIUM SERPL-SCNC: 136 MMOL/L — SIGNIFICANT CHANGE UP (ref 135–145)
SPECIMEN SOURCE: SIGNIFICANT CHANGE UP
WBC # BLD: 9.11 K/UL — SIGNIFICANT CHANGE UP (ref 3.8–10.5)
WBC # FLD AUTO: 9.11 K/UL — SIGNIFICANT CHANGE UP (ref 3.8–10.5)

## 2022-12-21 PROCEDURE — 99233 SBSQ HOSP IP/OBS HIGH 50: CPT

## 2022-12-21 PROCEDURE — 99222 1ST HOSP IP/OBS MODERATE 55: CPT

## 2022-12-21 RX ORDER — IPRATROPIUM/ALBUTEROL SULFATE 18-103MCG
1 AEROSOL WITH ADAPTER (GRAM) INHALATION
Refills: 0 | Status: DISCONTINUED | OUTPATIENT
Start: 2022-12-21 | End: 2022-12-22

## 2022-12-21 RX ORDER — ALBUTEROL 90 UG/1
2 AEROSOL, METERED ORAL EVERY 6 HOURS
Refills: 0 | Status: DISCONTINUED | OUTPATIENT
Start: 2022-12-21 | End: 2022-12-22

## 2022-12-21 RX ADMIN — Medication 1 PUFF(S): at 19:37

## 2022-12-21 RX ADMIN — Medication 3 MILLILITER(S): at 04:32

## 2022-12-21 RX ADMIN — Medication 75 MILLIGRAM(S): at 05:05

## 2022-12-21 RX ADMIN — Medication 650 MILLIGRAM(S): at 05:41

## 2022-12-21 RX ADMIN — Medication 1 PUFF(S): at 16:04

## 2022-12-21 RX ADMIN — ALBUTEROL 2 PUFF(S): 90 AEROSOL, METERED ORAL at 21:10

## 2022-12-21 RX ADMIN — Medication 3 MILLILITER(S): at 08:27

## 2022-12-21 RX ADMIN — ENOXAPARIN SODIUM 40 MILLIGRAM(S): 100 INJECTION SUBCUTANEOUS at 11:30

## 2022-12-21 RX ADMIN — Medication 40 MILLIGRAM(S): at 11:30

## 2022-12-21 RX ADMIN — Medication 75 MILLIGRAM(S): at 17:31

## 2022-12-21 RX ADMIN — Medication 650 MILLIGRAM(S): at 04:35

## 2022-12-21 RX ADMIN — Medication 600 MILLIGRAM(S): at 18:26

## 2022-12-21 RX ADMIN — Medication 40 MILLIGRAM(S): at 05:05

## 2022-12-21 RX ADMIN — Medication 1 PUFF(S): at 11:40

## 2022-12-21 NOTE — PROGRESS NOTE ADULT - ASSESSMENT
27M with PMHX Obesity, E-Cigarette Abuse presents to [facility] ER c/o SOB/ACOSTA, cough, fever/chills for past several days admitted for Acute Hypoxemic Respiratory Failure 2/2 Influenza A PNA r/o Atypical PNA.    PLAN:  Acute Hypoxemic Respiratory Failure 2/2 Influenza A PNA r/o atypical PNA  -Admit to SDU  -CTA Chest negative for PE +Atelectasis +Mosaic/GGO  -FLU A positive  -Duonebs q6 ATC and   - Tamiflu 75mg BID D3/5  - Transitioned to prednisone 40 mg QD  - Completed IV abx  - Pulm recs appreciated    Nicotine Abuse  - cessation    DVT ppx- Lovenox  Diet- Regular diet  Discharge likely tomorrow 12/21.

## 2022-12-21 NOTE — PROGRESS NOTE ADULT - REASON FOR ADMISSION
Acute Hypoxic Resp Failrue 2/2 FLU PNA

## 2022-12-21 NOTE — CONSULT NOTE ADULT - SUBJECTIVE AND OBJECTIVE BOX
Patient is a 27y old  Female who presents with a chief complaint of Acute Hypoxic Resp Failrue 2/2 FLU PNA (21 Dec 2022 12:11)      HPI:    27M with PMHX Obesity, E-Cigarette Abuse presents to [facility] ER c/o SOB/ACOSTA, cough, fever/chills for past several days which has progressively worsened. Using OTC Inhaler without improvement. Reported wheezing. +Sick Contacts works at . Found to be FLU A positive. Hypoxemic on arrival de-satting to 88% on 5L NC. Patient placed on HFNC 40LPM FIO2 60% satting 92-93%. MICU consulted. Patient seen/examined. Febrile in ED Tmax 101.7F. CXR negative. CTA +BL Atelectasis +GGO no PE.     ROS negative unless mentioned.              PAST MEDICAL & SURGICAL HISTORY:    Obesity    SOCIAL HISTORY:    ETOH use. Daily E-Cigarette use.    MEDICATIONS:    Pulmonary:  albuterol    90 MICROgram(s) HFA Inhaler 2 Puff(s) Inhalation every 6 hours PRN  albuterol/ipratropium (CFC free) Inhaler. 1 Puff(s) Inhalation four times a day    Antimicrobials:  oseltamivir 75 milliGRAM(s) Oral two times a day    Anticoagulants:  enoxaparin Injectable 40 milliGRAM(s) SubCutaneous every 24 hours    Onc:    GI/:  aluminum hydroxide/magnesium hydroxide/simethicone Suspension 30 milliLiter(s) Oral every 4 hours PRN    Endocrine:    Cardiac:    Other Medications:  acetaminophen     Tablet .. 650 milliGRAM(s) Oral every 6 hours PRN  melatonin 3 milliGRAM(s) Oral at bedtime PRN  ondansetron Injectable 4 milliGRAM(s) IV Push every 8 hours PRN      Allergies    amoxicillin (Hives)  penicillin (Hives)      Vital Signs Last 24 Hrs  T(C): 36.4 (21 Dec 2022 07:26), Max: 36.7 (21 Dec 2022 00:00)  T(F): 97.6 (21 Dec 2022 07:26), Max: 98 (21 Dec 2022 00:00)  HR: 57 (21 Dec 2022 12:00) (50 - 76)  BP: 128/73 (21 Dec 2022 12:00) (121/74 - 148/93)  BP(mean): 87 (21 Dec 2022 12:00) (84 - 90)  RR: 17 (21 Dec 2022 12:00) (17 - 20)  SpO2: 95% (21 Dec 2022 12:00) (92% - 100%)    Parameters below as of 21 Dec 2022 12:00  Patient On (Oxygen Delivery Method): nasal cannula  O2 Flow (L/min): 2      12-20 @ 07:01  -  12-21 @ 07:00  --------------------------------------------------------  IN: 0 mL / OUT: 600 mL / NET: -600 mL      LABS:      CBC Full  -  ( 21 Dec 2022 07:45 )  WBC Count : 9.11 K/uL  RBC Count : 4.94 M/uL  Hemoglobin : 13.3 g/dL  Hematocrit : 42.4 %  Platelet Count - Automated : 335 K/uL  Mean Cell Volume : 85.8 fl  Mean Cell Hemoglobin : 26.9 pg  Mean Cell Hemoglobin Concentration : 31.4 gm/dL      12-21    136  |  103  |  12.0  ----------------------------<  143<H>  4.0   |  22.0  |  0.69    Ca    8.9      21 Dec 2022 07:45  Phos  3.3     12-20  Mg     2.0     12-20    TPro  6.5<L>  /  Alb  3.4  /  TBili  <0.2<L>  /  DBili  x   /  AST  14  /  ALT  17  /  AlkPhos  75  12-21      RADIOLOGY & ADDITIONAL STUDIES (The following images were personally reviewed):    No pulmonary embolism however evaluation of the distal segmental and subsegmental areas is limited secondary to poor opacification.    Scattered linear atelectatic changes. Mosaic attenuation pattern to the lung parenchyma, nonspecific finding.    The spleen is enlarged at 16.4 cm.

## 2022-12-21 NOTE — PROGRESS NOTE ADULT - SUBJECTIVE AND OBJECTIVE BOX
[facility] Division of Hospital Medicine    Chief Complaint: Acute Hypoxic Resp Failure 2/2 FLU PNA     SUBJECTIVE / OVERNIGHT EVENTS: No acute events overnight. HD stable. Patient endorses cough.     Patient denies chest pain, SOB, abd pain, N/V, fever, chills, dysuria or any other complaints. All remainder ROS negative.     MEDICATIONS  (STANDING):  albuterol/ipratropium for Nebulization 3 milliLiter(s) Nebulizer every 6 hours  azithromycin   Tablet 500 milliGRAM(s) Oral daily  cefTRIAXone Injectable.      cefTRIAXone Injectable. 1000 milliGRAM(s) IV Push every 24 hours  enoxaparin Injectable 40 milliGRAM(s) SubCutaneous every 24 hours  methylPREDNISolone sodium succinate Injectable 40 milliGRAM(s) IV Push every 12 hours  oseltamivir 75 milliGRAM(s) Oral two times a day    MEDICATIONS  (PRN):  acetaminophen     Tablet .. 650 milliGRAM(s) Oral every 6 hours PRN Temp greater or equal to 38C (100.4F), Mild Pain (1 - 3)  aluminum hydroxide/magnesium hydroxide/simethicone Suspension 30 milliLiter(s) Oral every 4 hours PRN Dyspepsia  melatonin 3 milliGRAM(s) Oral at bedtime PRN Insomnia  ondansetron Injectable 4 milliGRAM(s) IV Push every 8 hours PRN Nausea and/or Vomiting        I&O's Summary      PHYSICAL EXAM:  Vital Signs Last 24 Hrs  T(C): 36.6 (20 Dec 2022 11:13), Max: 37.1 (19 Dec 2022 20:51)  T(F): 97.9 (20 Dec 2022 11:13), Max: 98.7 (19 Dec 2022 20:51)  HR: 65 (20 Dec 2022 11:13) (60 - 95)  BP: 138/83 (20 Dec 2022 11:13) (128/62 - 145/76)  BP(mean): --  RR: 20 (20 Dec 2022 11:13) (20 - 21)  SpO2: 95% (20 Dec 2022 11:13) (92% - 97%)    Parameters below as of 20 Dec 2022 11:13  Patient On (Oxygen Delivery Method): nasal cannula    CONSTITUTIONAL: NAD, well-developed  RESPIRATORY: Normal respiratory effort; decreased breath sounds 2/2 body habitus  CARDIOVASCULAR: Regular rate and rhythm, normal S1 and S2, no murmur/rub/gallop; No lower extremity edema; Peripheral pulses are 2+ bilaterally  ABDOMEN: Nontender to palpation, normoactive bowel sounds, no rebound/guarding  PSYCH: A+O to person, place, and time; affect appropriate  NEUROLOGY: CN 2-12 are intact and symmetric; no gross sensory deficits    LABS:                        14.4   11.67 )-----------( 332      ( 20 Dec 2022 02:42 )             45.8     12-20    139  |  103  |  11.5  ----------------------------<  173<H>  4.0   |  21.0<L>  |  0.70    Ca    9.2      20 Dec 2022 02:42  Phos  3.3     12-20  Mg     2.0     12-20    TPro  6.8  /  Alb  3.9  /  TBili  0.2<L>  /  DBili  x   /  AST  19  /  ALT  21  /  AlkPhos  89  12-20      CARDIAC MARKERS ( 19 Dec 2022 01:54 )  x     / <0.01 ng/mL / x     / x     / x              CAPILLARY BLOOD GLUCOSE            RADIOLOGY & ADDITIONAL TESTS:  Results Reviewed:   Imaging Personally Reviewed:  Electrocardiogram Personally Reviewed:                                          
[facility] Division of Hospital Medicine    Chief Complaint: Acute Hypoxic Resp Failure 2/2 FLU PNA      SUBJECTIVE / OVERNIGHT EVENTS: No acute events overnight. HD stable. Patient downtitrated to 2L NC.     Patient denies chest pain, SOB, abd pain, N/V, fever, chills, dysuria or any other complaints. All remainder ROS negative.     MEDICATIONS  (STANDING):  albuterol/ipratropium (CFC free) Inhaler. 1 Puff(s) Inhalation four times a day  enoxaparin Injectable 40 milliGRAM(s) SubCutaneous every 24 hours  methylPREDNISolone sodium succinate Injectable 40 milliGRAM(s) IV Push daily  oseltamivir 75 milliGRAM(s) Oral two times a day    MEDICATIONS  (PRN):  acetaminophen     Tablet .. 650 milliGRAM(s) Oral every 6 hours PRN Temp greater or equal to 38C (100.4F), Mild Pain (1 - 3)  albuterol    90 MICROgram(s) HFA Inhaler 2 Puff(s) Inhalation every 6 hours PRN Shortness of Breath and/or Wheezing  aluminum hydroxide/magnesium hydroxide/simethicone Suspension 30 milliLiter(s) Oral every 4 hours PRN Dyspepsia  melatonin 3 milliGRAM(s) Oral at bedtime PRN Insomnia  ondansetron Injectable 4 milliGRAM(s) IV Push every 8 hours PRN Nausea and/or Vomiting        I&O's Summary    20 Dec 2022 07:01  -  21 Dec 2022 07:00  --------------------------------------------------------  IN: 0 mL / OUT: 600 mL / NET: -600 mL        PHYSICAL EXAM:  Vital Signs Last 24 Hrs  T(C): 36.4 (21 Dec 2022 07:26), Max: 36.7 (21 Dec 2022 00:00)  T(F): 97.6 (21 Dec 2022 07:26), Max: 98 (21 Dec 2022 00:00)  HR: 60 (21 Dec 2022 11:42) (50 - 76)  BP: 129/75 (21 Dec 2022 08:00) (121/74 - 148/93)  BP(mean): 89 (21 Dec 2022 08:00) (84 - 90)  RR: 18 (21 Dec 2022 08:00) (18 - 20)  SpO2: 96% (21 Dec 2022 11:42) (92% - 100%)    Parameters below as of 21 Dec 2022 11:42  Patient On (Oxygen Delivery Method): nasal cannula,2l    CONSTITUTIONAL: NAD, well-developed  RESPIRATORY: Normal respiratory effort; decreased breath sounds 2/2 body habitus  CARDIOVASCULAR: Regular rate and rhythm, normal S1 and S2, no murmur/rub/gallop; No lower extremity edema; Peripheral pulses are 2+ bilaterally  ABDOMEN: Nontender to palpation, normoactive bowel sounds, no rebound/guarding  PSYCH: A+O to person, place, and time; affect appropriate  NEUROLOGY: CN 2-12 are intact and symmetric; no gross sensory deficits    LABS:                        13.3   9.11  )-----------( 335      ( 21 Dec 2022 07:45 )             42.4     12-21    136  |  103  |  12.0  ----------------------------<  143<H>  4.0   |  22.0  |  0.69    Ca    8.9      21 Dec 2022 07:45  Phos  3.3     12-20  Mg     2.0     12-20    TPro  6.5<L>  /  Alb  3.4  /  TBili  <0.2<L>  /  DBili  x   /  AST  14  /  ALT  17  /  AlkPhos  75  12-21              CAPILLARY BLOOD GLUCOSE            RADIOLOGY & ADDITIONAL TESTS:  Results Reviewed:   Imaging Personally Reviewed:  Electrocardiogram Personally Reviewed:                                          
[facility] Division of Hospital Medicine    Chief Complaint: Acute Hypoxemic Respiratory Failure 2/2 Influenza A PNA r/o atypical PNA     SUBJECTIVE / OVERNIGHT EVENTS: No acute events. Yesterday evening, patient was febrile and tachy 120s. She continues to require NC 5L.      Patient denies chest pain, SOB, abd pain, N/V, fever, chills, dysuria or any other complaints. All remainder ROS negative.     MEDICATIONS  (STANDING):  albuterol/ipratropium for Nebulization 3 milliLiter(s) Nebulizer every 6 hours  azithromycin   Tablet 500 milliGRAM(s) Oral daily  cefTRIAXone Injectable.      enoxaparin Injectable 40 milliGRAM(s) SubCutaneous every 24 hours  methylPREDNISolone sodium succinate Injectable 40 milliGRAM(s) IV Push every 8 hours  oseltamivir 75 milliGRAM(s) Oral two times a day    MEDICATIONS  (PRN):  acetaminophen     Tablet .. 650 milliGRAM(s) Oral every 6 hours PRN Temp greater or equal to 38C (100.4F), Mild Pain (1 - 3)  aluminum hydroxide/magnesium hydroxide/simethicone Suspension 30 milliLiter(s) Oral every 4 hours PRN Dyspepsia  melatonin 3 milliGRAM(s) Oral at bedtime PRN Insomnia  ondansetron Injectable 4 milliGRAM(s) IV Push every 8 hours PRN Nausea and/or Vomiting        I&O's Summary      PHYSICAL EXAM:  Vital Signs Last 24 Hrs  T(C): 37.6 (19 Dec 2022 00:03), Max: 38.7 (18 Dec 2022 19:41)  T(F): 99.6 (19 Dec 2022 00:03), Max: 101.7 (18 Dec 2022 19:41)  HR: 107 (19 Dec 2022 00:03) (107 - 120)  BP: 153/84 (19 Dec 2022 00:03) (148/92 - 153/84)  BP(mean): --  RR: 22 (19 Dec 2022 00:03) (22 - 22)  SpO2: 93% (19 Dec 2022 00:03) (93% - 95%)    Parameters below as of 19 Dec 2022 00:03  Patient On (Oxygen Delivery Method): nasal cannula  O2 Flow (L/min): 5    CONSTITUTIONAL: NAD, well-developed  RESPIRATORY: Normal respiratory effort; decreased breath sounds 2/2 body habitus  CARDIOVASCULAR: Regular rate and rhythm, normal S1 and S2, no murmur/rub/gallop; No lower extremity edema; Peripheral pulses are 2+ bilaterally  ABDOMEN: Nontender to palpation, normoactive bowel sounds, no rebound/guarding  PSYCH: A+O to person, place, and time; affect appropriate  NEUROLOGY: CN 2-12 are intact and symmetric; no gross sensory deficits    LABS:                        13.9   4.63  )-----------( 320      ( 18 Dec 2022 21:30 )             42.8     12-18    136  |  100  |  8.0  ----------------------------<  109<H>  3.7   |  21.0<L>  |  0.93    Ca    9.2      18 Dec 2022 21:30    TPro  7.5  /  Alb  4.1  /  TBili  0.4  /  DBili  x   /  AST  19  /  ALT  22  /  AlkPhos  91  12-18      CARDIAC MARKERS ( 19 Dec 2022 01:54 )  x     / <0.01 ng/mL / x     / x     / x              CAPILLARY BLOOD GLUCOSE            RADIOLOGY & ADDITIONAL TESTS:  Results Reviewed:   Imaging Personally Reviewed:  Electrocardiogram Personally Reviewed:

## 2022-12-21 NOTE — CONSULT NOTE ADULT - ASSESSMENT
Acute hypoxic respiratory failure 2/2 Influenza  Resolving , currently taken off O2 with good tolerance   Finish course of Tamiflu , switch inhalers to as needed   Rest of care as per primary team   Finish 5 days of Prednisone

## 2022-12-22 ENCOUNTER — TRANSCRIPTION ENCOUNTER (OUTPATIENT)
Age: 27
End: 2022-12-22

## 2022-12-22 VITALS — TEMPERATURE: 98 F

## 2022-12-22 LAB
ALBUMIN SERPL ELPH-MCNC: 3.9 G/DL — SIGNIFICANT CHANGE UP (ref 3.3–5.2)
ALP SERPL-CCNC: 78 U/L — SIGNIFICANT CHANGE UP (ref 40–120)
ALT FLD-CCNC: 26 U/L — SIGNIFICANT CHANGE UP
ANION GAP SERPL CALC-SCNC: 13 MMOL/L — SIGNIFICANT CHANGE UP (ref 5–17)
AST SERPL-CCNC: 18 U/L — SIGNIFICANT CHANGE UP
BILIRUB SERPL-MCNC: 0.2 MG/DL — LOW (ref 0.4–2)
BUN SERPL-MCNC: 14.7 MG/DL — SIGNIFICANT CHANGE UP (ref 8–20)
CALCIUM SERPL-MCNC: 9.2 MG/DL — SIGNIFICANT CHANGE UP (ref 8.4–10.5)
CHLORIDE SERPL-SCNC: 102 MMOL/L — SIGNIFICANT CHANGE UP (ref 96–108)
CO2 SERPL-SCNC: 25 MMOL/L — SIGNIFICANT CHANGE UP (ref 22–29)
CREAT SERPL-MCNC: 0.73 MG/DL — SIGNIFICANT CHANGE UP (ref 0.5–1.3)
EGFR: 115 ML/MIN/1.73M2 — SIGNIFICANT CHANGE UP
GLUCOSE SERPL-MCNC: 100 MG/DL — HIGH (ref 70–99)
HCT VFR BLD CALC: 44.3 % — SIGNIFICANT CHANGE UP (ref 34.5–45)
HGB BLD-MCNC: 13.8 G/DL — SIGNIFICANT CHANGE UP (ref 11.5–15.5)
MCHC RBC-ENTMCNC: 27 PG — SIGNIFICANT CHANGE UP (ref 27–34)
MCHC RBC-ENTMCNC: 31.2 GM/DL — LOW (ref 32–36)
MCV RBC AUTO: 86.5 FL — SIGNIFICANT CHANGE UP (ref 80–100)
PLATELET # BLD AUTO: 357 K/UL — SIGNIFICANT CHANGE UP (ref 150–400)
POTASSIUM SERPL-MCNC: 4 MMOL/L — SIGNIFICANT CHANGE UP (ref 3.5–5.3)
POTASSIUM SERPL-SCNC: 4 MMOL/L — SIGNIFICANT CHANGE UP (ref 3.5–5.3)
PROT SERPL-MCNC: 6.8 G/DL — SIGNIFICANT CHANGE UP (ref 6.6–8.7)
RBC # BLD: 5.12 M/UL — SIGNIFICANT CHANGE UP (ref 3.8–5.2)
RBC # FLD: 15 % — HIGH (ref 10.3–14.5)
S PNEUM AG UR QL: NEGATIVE — SIGNIFICANT CHANGE UP
SODIUM SERPL-SCNC: 140 MMOL/L — SIGNIFICANT CHANGE UP (ref 135–145)
WBC # BLD: 10.15 K/UL — SIGNIFICANT CHANGE UP (ref 3.8–10.5)
WBC # FLD AUTO: 10.15 K/UL — SIGNIFICANT CHANGE UP (ref 3.8–10.5)

## 2022-12-22 PROCEDURE — 85027 COMPLETE CBC AUTOMATED: CPT

## 2022-12-22 PROCEDURE — 87641 MR-STAPH DNA AMP PROBE: CPT

## 2022-12-22 PROCEDURE — 96375 TX/PRO/DX INJ NEW DRUG ADDON: CPT

## 2022-12-22 PROCEDURE — 82803 BLOOD GASES ANY COMBINATION: CPT

## 2022-12-22 PROCEDURE — 83735 ASSAY OF MAGNESIUM: CPT

## 2022-12-22 PROCEDURE — 85025 COMPLETE CBC W/AUTO DIFF WBC: CPT

## 2022-12-22 PROCEDURE — 99239 HOSP IP/OBS DSCHRG MGMT >30: CPT

## 2022-12-22 PROCEDURE — 87449 NOS EACH ORGANISM AG IA: CPT

## 2022-12-22 PROCEDURE — 84145 PROCALCITONIN (PCT): CPT

## 2022-12-22 PROCEDURE — 87637 SARSCOV2&INF A&B&RSV AMP PRB: CPT

## 2022-12-22 PROCEDURE — 83880 ASSAY OF NATRIURETIC PEPTIDE: CPT

## 2022-12-22 PROCEDURE — 84702 CHORIONIC GONADOTROPIN TEST: CPT

## 2022-12-22 PROCEDURE — 94640 AIRWAY INHALATION TREATMENT: CPT

## 2022-12-22 PROCEDURE — 71045 X-RAY EXAM CHEST 1 VIEW: CPT

## 2022-12-22 PROCEDURE — 71275 CT ANGIOGRAPHY CHEST: CPT | Mod: MB

## 2022-12-22 PROCEDURE — 87899 AGENT NOS ASSAY W/OPTIC: CPT

## 2022-12-22 PROCEDURE — 99285 EMERGENCY DEPT VISIT HI MDM: CPT

## 2022-12-22 PROCEDURE — 80053 COMPREHEN METABOLIC PANEL: CPT

## 2022-12-22 PROCEDURE — 36415 COLL VENOUS BLD VENIPUNCTURE: CPT

## 2022-12-22 PROCEDURE — 87070 CULTURE OTHR SPECIMN AEROBIC: CPT

## 2022-12-22 PROCEDURE — 94760 N-INVAS EAR/PLS OXIMETRY 1: CPT

## 2022-12-22 PROCEDURE — 96365 THER/PROPH/DIAG IV INF INIT: CPT

## 2022-12-22 PROCEDURE — 84484 ASSAY OF TROPONIN QUANT: CPT

## 2022-12-22 PROCEDURE — 93005 ELECTROCARDIOGRAM TRACING: CPT

## 2022-12-22 PROCEDURE — 87640 STAPH A DNA AMP PROBE: CPT

## 2022-12-22 PROCEDURE — 84100 ASSAY OF PHOSPHORUS: CPT

## 2022-12-22 RX ORDER — ALBUTEROL 90 UG/1
2 AEROSOL, METERED ORAL
Qty: 1 | Refills: 0
Start: 2022-12-22

## 2022-12-22 RX ADMIN — Medication 1 PUFF(S): at 09:03

## 2022-12-22 RX ADMIN — Medication 40 MILLIGRAM(S): at 05:29

## 2022-12-22 RX ADMIN — Medication 1 PUFF(S): at 15:46

## 2022-12-22 RX ADMIN — Medication 75 MILLIGRAM(S): at 05:29

## 2022-12-22 RX ADMIN — Medication 600 MILLIGRAM(S): at 05:29

## 2022-12-22 NOTE — DISCHARGE NOTE NURSING/CASE MANAGEMENT/SOCIAL WORK - NSDCPEFALRISK_GEN_ALL_CORE
For information on Fall & Injury Prevention, visit: [email or web address] OR  [email or web address] OR  https://www.cdc.gov/steadi/patient.html

## 2022-12-22 NOTE — DISCHARGE NOTE PROVIDER - HOSPITAL COURSE
Pt is a 27M with PMHX Obesity, E-Cigarette Abuse presents to [facility] ER c/o SOB/ACOSTA, cough, fever/chills for past several days admitted for Acute Hypoxemic Respiratory Failure 2/2 Influenza A PNA r/o Atypical PNA. Started on Tamiflu and steroids. Seen by Pulmonology. CTA Chest negative for PE +Atelectasis +Mosaic/GGO. To complete course of Tamilfu and steroids as prescribed. Completed course of antibiotics. Pt to be discharged home.

## 2022-12-22 NOTE — DISCHARGE NOTE PROVIDER - CARE PROVIDER_API CALL
Santhosh Onofre)  Internal Medicine; Pulmonary Disease  [address] [place] [address]  Phone: [phone]  Fax: [phone]  Follow Up Time: 1 week

## 2022-12-22 NOTE — DISCHARGE NOTE PROVIDER - NSDCMRMEDTOKEN_GEN_ALL_CORE_FT
albuterol 90 mcg/inh inhalation aerosol: 2 puff(s) inhaled every 6 hours, As needed, Shortness of Breath and/or Wheezing  guaiFENesin 600 mg oral tablet, extended release: 1 tab(s) orally every 12 hours  oseltamivir 75 mg oral capsule: 1 cap(s) orally 2 times a day  predniSONE 20 mg oral tablet: 2 tab(s) orally once a day

## 2022-12-22 NOTE — DISCHARGE NOTE NURSING/CASE MANAGEMENT/SOCIAL WORK - PATIENT PORTAL LINK FT
You can access the FollowMyHealth Patient Portal offered by [facility] by registering at the following website: [email or web address]. By joining [facility]’s FollowMyHealth portal, you will also be able to view your health information using other applications (apps) compatible with our system.

## 2022-12-22 NOTE — DISCHARGE NOTE PROVIDER - NSDCCPCAREPLAN_GEN_ALL_CORE_FT
PRINCIPAL DISCHARGE DIAGNOSIS  Diagnosis: Acute respiratory failure with hypoxia  Assessment and Plan of Treatment: Due to the flu. To complete course of Tamilfu and steroids as prescribed. Completed course of antibiotics. Pt to be discharged home. Please follow up with your primary care physician and pulmonology within 1 week.      SECONDARY DISCHARGE DIAGNOSES  Diagnosis: Influenza  Assessment and Plan of Treatment: Started on Tamiflu and steroids. Seen by Pulmonology. To complete course of Tamilfu and steroids as prescribed. Completed course of antibiotics. Pt to be discharged home.    Diagnosis: Smoker  Assessment and Plan of Treatment: cessation advised

## 2022-12-22 NOTE — CHART NOTE - NSCHARTNOTEFT_GEN_A_CORE
To whom it may concern,    Re: PINA GROVES    Patient was admitted under the care of [facility] in [place], [place] from 12-19-22 to today, 12-22-22. Patient is cleared at this time to return to work without restriction on 12-26-22 unless clinical status changes.    If there are any questions, please contact me at [phone]. Thank you.    Signed,           Beatriz Elizondo PA-C

## 2022-12-22 NOTE — DISCHARGE NOTE PROVIDER - ATTENDING DISCHARGE PHYSICAL EXAMINATION:
Vital Signs Last 24 Hrs  T(F): 97.6 (22 Dec 2022 06:00), Max: 97.8 (22 Dec 2022 04:00)  HR: 70 (22 Dec 2022 09:04) (48 - 70)  BP: 134/78 (22 Dec 2022 08:00) (121/72 - 136/71)  RR: 18 (22 Dec 2022 08:00) (18 - 18)  SpO2: 95% (22 Dec 2022 08:00) (93% - 98%)    Physical Exam:  Constitutional: NAD, awake and alert, well-developed  Neck: Soft and supple, No LAD, No JVD  Respiratory: cta b/l no wheezing no rhonchi  Cardiovascular: +s1/s2 no edema b/l le  Gastrointestinal: soft nt nd bs+  Vascular: 2+ peripheral pulses  Neurological: A/O x 3, no focal deficits

## 2022-12-24 LAB
CULTURE RESULTS: SIGNIFICANT CHANGE UP
SPECIMEN SOURCE: SIGNIFICANT CHANGE UP

## 2022-12-29 ENCOUNTER — APPOINTMENT (OUTPATIENT)
Dept: PULMONOLOGY | Facility: CLINIC | Age: 27
End: 2022-12-29

## 2022-12-29 VITALS
SYSTOLIC BLOOD PRESSURE: 126 MMHG | OXYGEN SATURATION: 98 % | WEIGHT: 293 LBS | HEART RATE: 85 BPM | HEIGHT: 70 IN | BODY MASS INDEX: 41.95 KG/M2 | RESPIRATION RATE: 16 BRPM | DIASTOLIC BLOOD PRESSURE: 80 MMHG

## 2022-12-29 DIAGNOSIS — Z78.9 OTHER SPECIFIED HEALTH STATUS: ICD-10-CM

## 2022-12-29 DIAGNOSIS — R05.9 COUGH, UNSPECIFIED: ICD-10-CM

## 2022-12-29 DIAGNOSIS — E66.01 MORBID (SEVERE) OBESITY DUE TO EXCESS CALORIES: ICD-10-CM

## 2022-12-29 DIAGNOSIS — R93.89 ABNORMAL FINDINGS ON DIAGNOSTIC IMAGING OF OTHER SPECIFIED BODY STRUCTURES: ICD-10-CM

## 2022-12-29 DIAGNOSIS — J98.11 ATELECTASIS: ICD-10-CM

## 2022-12-29 DIAGNOSIS — Z87.09 PERSONAL HISTORY OF OTHER DISEASES OF THE RESPIRATORY SYSTEM: ICD-10-CM

## 2022-12-29 DIAGNOSIS — Z86.16 PERSONAL HISTORY OF COVID-19: ICD-10-CM

## 2022-12-29 DIAGNOSIS — R06.02 SHORTNESS OF BREATH: ICD-10-CM

## 2022-12-29 DIAGNOSIS — G47.33 OBSTRUCTIVE SLEEP APNEA (ADULT) (PEDIATRIC): ICD-10-CM

## 2022-12-29 DIAGNOSIS — Z80.49 FAMILY HISTORY OF MALIGNANT NEOPLASM OF OTHER GENITAL ORGANS: ICD-10-CM

## 2022-12-29 PROBLEM — Z00.00 ENCOUNTER FOR PREVENTIVE HEALTH EXAMINATION: Status: ACTIVE | Noted: 2022-12-29

## 2022-12-29 PROCEDURE — 99496 TRANSJ CARE MGMT HIGH F2F 7D: CPT

## 2022-12-29 RX ORDER — ALBUTEROL SULFATE 90 UG/1
108 (90 BASE) INHALANT RESPIRATORY (INHALATION)
Refills: 0 | Status: ACTIVE | COMMUNITY

## 2022-12-29 NOTE — REASON FOR VISIT
[Initial] : an initial visit [Abnormal CXR/ Chest CT] : an abnormal CXR/ chest CT [Sleep Apnea] : sleep apnea [Cough] : cough [Shortness of Breath] : shortness of breath [Obesity] : obesity

## 2022-12-29 NOTE — DISCUSSION/SUMMARY
[FreeTextEntry1] : \par #1. Will schedule PFTs in near future to assess lung function \par #2. The patient does not appear to require chronic BD therapy at this time but has Albuterol inhaler to use as needed which she uses on occasion.\par #3. Diet and exercise for weight loss\par #4. SOBOE is likely at least somewhat related to weight or deconditioning \par #5. Chest CTA with mosaic pattern and atelectasis; consider repeat in 6 months\par #6. Home PSG to evaluate for possible TRUONG. Consider HST if not approved. \par #7. Consider PAP therapy for significant TRUONG \par #8. Pt had both Covid vaccines and s/p Covid infection\par #9. F/u in 2 months with PFTs and HST\par #10. Reviewed risks of exposure and symptoms of Covid-19 virus, including how the virus is spread and precautions to avoid sofy virus. \par \par The patient expressed understanding and agreement with the above recommendations/plan and accepts responsibility to be compliant with recommended testing, therapies, and f/u visits.\par All relevant questions and concerns were addressed.

## 2022-12-29 NOTE — REVIEW OF SYSTEMS
[Fatigue] : fatigue [Nasal Congestion] : nasal congestion [Cough] : cough [SOB on Exertion] : sob on exertion [Back Pain] : back pain [Seasonal Allergies] : seasonal allergies [Negative] : Endocrine

## 2022-12-29 NOTE — HISTORY OF PRESENT ILLNESS
[Former] : former [Never] : never [Snoring] : snoring [Unrefreshing Sleep] : unrefreshing sleep [Initial Evaluation] : an initial evaluation of [Excess Weight] : excess weight [Currently Experiencing] : The patient is currently experiencing symptoms. [Dyspnea] : dyspnea [Back Pain] : back pain [Joint Pain] : joint pain [Low Calorie Diet] : low calorie diet [Fair Compliance] : fair compliance with treatment [Fair Tolerance] : fair tolerance of treatment [Poor Symptom Control] : poor symptom control [High] : high [Low Calorie] : low calorie [Well Balanced Diet] : well balanced meals [On ___] : performed on [unfilled] [Patient] : the patient [To Assess ___] : to assess [unfilled] [None] : no new symptoms reported [TextBox_4] : Pt with h/o prior Covid infection in 6/2022\par \par Pt admitted to [facility] from 12/19/22 to 12/22/22 for Acute Hypoxic Resp Failrue 2/2 FLU PNA with the following d/c summary:	\par Hospital Course: Pt is a 27M with PMHX Obesity, E-Cigarette Abuse presents to [facility] ER c/o SOB/ACOSTA, cough, fever/chills for past several days admitted for Acute Hypoxemic Respiratory Failure 2/2 Influenza A PNA r/o Atypical PNA. Started on Tamiflu and steroids. Seen by Pulmonology. CTA Chest negative for PE +Atelectasis +Mosaic/GGO. To complete course of Tamilfu and steroids as prescribed. Completed course of antibiotics.\par \par Pt initially was seen in UC and then sent to ER for hypoxia\par Since d/c, pt is feeling much better with mild residual cough and SOBOE.\par She has stopped E-cigarette use since her admission [Excessive Daytime Sleepiness] : no excessive daytime sleepiness [Witnessed Apnea During Sleep] : no witnessed apnea during sleep [Witnessed Gasping During Sleep] : no witnessed gasping during sleep [Sleepy When Sedentary] : no sleepy when sedentary [FreeTextEntry9] : Chest CTA [FreeTextEntry8] : Neg for PE with mosaic pattern and atelectasis [ESS] : 5 [TextBox_11] : 4

## 2022-12-29 NOTE — CONSULT LETTER
[Dear  ___] : Dear  [unfilled], [Consult Letter:] : I had the pleasure of evaluating your patient, [unfilled]. [Please see my note below.] : Please see my note below. [Consult Closing:] : Thank you very much for allowing me to participate in the care of this patient.  If you have any questions, please do not hesitate to contact me. [Sincerely,] : Sincerely, [FreeTextEntry3] : Miguel Angel Alcocer MD, FCCP, D. ABSM\par Pulmonary and Sleep Medicine\par [facility] [place]

## 2023-02-24 NOTE — ED PROVIDER NOTE - NS_EDPROVIDERDISPOUSERTYPE_ED_A_ED
- Blood pressure 160/100 on exam   On chart review previous blood pressure 146/90  -EKG normal sinus rhythm, possible left atrial enlargement  -Start losartan 25 mg daily  -Monitor blood pressure at home and record in log  -Low-sodium diet less than 2000 mg daily  -Routine exercise  -Limit caffeine  -Check echo  -Follow-up in 4 weeks Attending Attestation (For Attendings USE Only)...

## 2023-03-20 ENCOUNTER — APPOINTMENT (OUTPATIENT)
Dept: PULMONOLOGY | Facility: CLINIC | Age: 28
End: 2023-03-20

## 2023-09-24 ENCOUNTER — NON-APPOINTMENT (OUTPATIENT)
Age: 28
End: 2023-09-24

## 2023-09-29 ENCOUNTER — OFFICE (OUTPATIENT)
Dept: URBAN - METROPOLITAN AREA CLINIC 104 | Facility: CLINIC | Age: 28
Setting detail: OPHTHALMOLOGY
End: 2023-09-29
Payer: COMMERCIAL

## 2023-09-29 DIAGNOSIS — B30.9: ICD-10-CM

## 2023-09-29 PROCEDURE — 99203 OFFICE O/P NEW LOW 30 MIN: CPT | Performed by: OPTOMETRIST

## 2023-09-29 ASSESSMENT — TONOMETRY
OD_IOP_MMHG: 16
OS_IOP_MMHG: 16

## 2023-09-29 ASSESSMENT — CONFRONTATIONAL VISUAL FIELD TEST (CVF)
OD_FINDINGS: FULL
OS_FINDINGS: FULL

## 2023-09-29 ASSESSMENT — VISUAL ACUITY
OS_BCVA: 20/20
OD_BCVA: 20/20-1

## 2023-10-19 ENCOUNTER — OFFICE (OUTPATIENT)
Dept: URBAN - METROPOLITAN AREA CLINIC 104 | Facility: CLINIC | Age: 28
Setting detail: OPHTHALMOLOGY
End: 2023-10-19
Payer: COMMERCIAL

## 2023-10-19 DIAGNOSIS — B30.9: ICD-10-CM

## 2023-10-19 PROCEDURE — 99213 OFFICE O/P EST LOW 20 MIN: CPT | Performed by: OPTOMETRIST

## 2023-10-19 ASSESSMENT — TONOMETRY
OD_IOP_MMHG: 18
OS_IOP_MMHG: 18

## 2023-10-19 ASSESSMENT — VISUAL ACUITY
OS_BCVA: 20/20
OD_BCVA: 20/20

## 2023-10-19 ASSESSMENT — CONFRONTATIONAL VISUAL FIELD TEST (CVF)
OD_FINDINGS: FULL
OS_FINDINGS: FULL

## 2023-12-30 ENCOUNTER — NON-APPOINTMENT (OUTPATIENT)
Age: 28
End: 2023-12-30

## 2024-01-06 ENCOUNTER — NON-APPOINTMENT (OUTPATIENT)
Age: 29
End: 2024-01-06

## 2024-01-18 ENCOUNTER — NON-APPOINTMENT (OUTPATIENT)
Age: 29
End: 2024-01-18

## 2024-03-14 ENCOUNTER — NON-APPOINTMENT (OUTPATIENT)
Age: 29
End: 2024-03-14

## 2024-08-27 ENCOUNTER — NON-APPOINTMENT (OUTPATIENT)
Age: 29
End: 2024-08-27

## 2025-02-26 NOTE — DISCHARGE NOTE PROVIDER - PROVIDER RX CONTACT NUMBER
Spoke with patient who has concerns regarding missed ocp pills. Pt states she was traveling and misplaced her birth control. Patient iscurrently taking micronor and missed pill from 2/19/25 to 2/23/25. Once she found mediation, she restarted pills on 2/24/25 and took 2 pills for that day. Pt states that she had protected intercourse on 2/24/25 with condom but is till concerned and would like plan B sent to the pharmacy. Pt states that  had previously approved an rx to be sent with additional refills.     : pt would like to know how to proceed with current bc pack. She is currently on the 3rd week of the pack. Please advise    Can plan b rx be sent with refills?   
[phone]

## 2025-04-11 ENCOUNTER — NON-APPOINTMENT (OUTPATIENT)
Age: 30
End: 2025-04-11

## 2025-05-31 ENCOUNTER — NON-APPOINTMENT (OUTPATIENT)
Age: 30
End: 2025-05-31